# Patient Record
Sex: MALE | Race: OTHER | HISPANIC OR LATINO | URBAN - METROPOLITAN AREA
[De-identification: names, ages, dates, MRNs, and addresses within clinical notes are randomized per-mention and may not be internally consistent; named-entity substitution may affect disease eponyms.]

---

## 2021-02-01 RX ORDER — PANTOPRAZOLE SODIUM 20 MG/1
1 TABLET, DELAYED RELEASE ORAL
Qty: 0 | Refills: 0 | DISCHARGE

## 2021-02-01 RX ORDER — CHLORHEXIDINE GLUCONATE 213 G/1000ML
1 SOLUTION TOPICAL ONCE
Refills: 0 | Status: DISCONTINUED | OUTPATIENT
Start: 2021-04-13 | End: 2021-04-14

## 2021-02-01 NOTE — H&P ADULT - HISTORY OF PRESENT ILLNESS
74 y/o male with PMHx HTN, DM, HLD, PVD, Alcohol abuse disorder BPH, ED   Who initially presented to [place] with a few days of epigastric pain that was initially relived with Prilosec but later became worse, burning in nature 10/10  radiating to left hand and associated with nausea.   Subsequently patient underwent cardiac cath with YOSELIN to midRCA (85% ulcerative Culprit) and YOSELIN to RPDA (99% culprit) , residual midLAD 85%, distal LAD 50%m OM2 85%. Patient had elevated filling pressures during the procedure requiring IV nitro and Labetolol. LV gram showed EF 60%.     Patient has been compliant with DAPT   ****SKELETON****    Cardiologist: Dr. Jarvis NOLAN:  Pharmacy:  Escort:  76 y/o male with PMHx HTN, DM, HLD, PVD, Alcohol abuse disorder BPH, ED Who initially presented to [place] with a few days of epigastric pain that was initially relived with Prilosec but later became worse, burning in nature 10/10  radiating to left hand and associated with nausea. Subsequently patient underwent cardiac cath with YOSELIN to midRCA (85% ulcerative Culprit) and YOSELIN to RPDA (99% culprit) , residual midLAD 85%, distal LAD 50%m OM2 85%. Patient had elevated filling pressures during the procedure requiring IV nitro and Labetolol. LV gram showed EF 60%.   Since his procedure patient has been feeling_  Patient denies active chest pain, palpitations, shortness of breath, diaphoresis, fatigue, dizziness, syncope, lower extremity edema, orthopnea, positional nocturnal dyspnea, recent fever, chills, night sweats, cough, nausea, vomiting, and diarrhea.  Patient has been compliant with DAPT.  In light of patients risk factors, CCS class **, and known residual disease patient now presents for staged PCI of LAD and OM1

## 2021-04-01 VITALS
RESPIRATION RATE: 16 BRPM | DIASTOLIC BLOOD PRESSURE: 87 MMHG | HEART RATE: 76 BPM | OXYGEN SATURATION: 98 % | TEMPERATURE: 98 F | SYSTOLIC BLOOD PRESSURE: 220 MMHG

## 2021-04-01 NOTE — H&P ADULT - HISTORY OF PRESENT ILLNESS
****SKELETON****    Cardiologist: Dr. Jarvis NOLAN:  Pharmacy:  Escort:  74 y/o male with PMHx HTN, DM, HLD, PVD, Alcohol abuse disorder, BPH, CAD w/ NSTEMI s/p cardiac cath @ [facility] 01/02/2021  YOSELIN to midRCA (85% ulcerative Culprit) and YOSELIN to RPDA (99% culprit) , residual midLAD 85%, distal LAD 50%m OM2 85%. Patient had elevated filling pressures during the procedure requiring IV nitro and Labetolol. LV gram showed EF 60%  who now presents for staged PCI of LAD and OM1    Since his procedure patient has been feeling_...   Patient denies active chest pain, palpitations, shortness of breath, diaphoresis, fatigue, dizziness, syncope, lower extremity edema, orthopnea, positional nocturnal dyspnea, recent fever, chills, night sweats, cough, nausea, vomiting, and diarrhea.  Patient has been compliant with DAPT.  In light of patients risk factors, CCS class **, and known residual disease patient now presents for staged intervention,    ****SKELETON****    Cardiologist: Dr. Jarvis NOLAN:  Pharmacy:  Escort:  76 yo M with alcohol abuse disorder, PMHx HTN, DM, HLD, PVD, alcohol abuse disorder, BPH, CAD w/ NSTEMI s/p cardiac cath @ [facility] 01/02/2021  mRCA 85% s/p YOSELIN & RPDA 99% culprit s/p YOSELIN  (residual midLAD 85%, distal LAD 50%, OM2 85%)      . Patient had elevated filling pressures during the procedure requiring IV nitro and Labetolol. LV gram showed EF 60%  who now presents for staged PCI of LAD and OM1    Since his procedure patient has been feeling_...   Patient denies active chest pain, palpitations, shortness of breath, diaphoresis, fatigue, dizziness, syncope, lower extremity edema, orthopnea, positional nocturnal dyspnea, recent fever, chills, night sweats, cough, nausea, vomiting, and diarrhea.  Patient has been compliant with DAPT.  In light of patients risk factors, CCS class **, and known residual disease patient now presents for staged intervention,    ****SKELETON****    Cardiologist: Dr. Jarvis NOLAN:  Pharmacy:  Escort:    74 yo M with h/o alcohol abuse disorder, PMHx HTN, DM2, HLD, PVD, CAD w/ NSTEMI s/p cardiac cath @ [facility] 01/02/2021  mRCA 85% s/p YOSELIN & RPDA 99% culprit s/p YOSELIN  (residual midLAD 85%, distal LAD 50%, OM2 85%), gastritis (?colonoscopy/EGD), BPH,  who initially presented to [facility] with c/o epigastric pain described as burning and of 10/10 intensity radiating to L hand with associated nausea occurring independent of exertion that was initially relieved with Prilosec but later became wore.   Since procedure, pt endorses ___.   Patient has been compliant with DAPT.  Denies CP, SOB, dizziness, diaphoresis, palpitations, fatigue, LE edema, othopnea, PND, syncope, BRBPR, melena, epistaxis, hematemesis.   In light of patients risk factors, recent NSTEMI, known physiologically significant lesions, pt referred for staged PCI/atherectomy of residual mLAD 85% & OM2 85% lesions.       Cath hx:  Cardiac cath at [facility] 1/2/21: mRCA 85% s/p YOSELIN, RPDA 99% s/p YOSELIN with residual mLAD 85%, dLAD 50%, OM2 85%. During cath pt had elevated filling pressures  and given IV nitro and Labetolol,  LV gram showed EF 55%, referred for staged PCI of LAD/OM2 with atherectomy in 5-6 weeks.  ****SKELETON****    Cardiologist: Dr. Jarvis NOLAN:  Pharmacy:  Escort:    74 yo M with h/o alcohol abuse disorder, PMHx HTN, DM2, HLD, PVD, CAD w/ NSTEMI s/p cardiac cath @ [facility] 01/02/21  mRCA 85% s/p YOSELIN & RPDA 99% culprit s/p YOSELIN  (residual midLAD 85%, distal LAD 50%, OM2 85%), CVA (?when, residual deficits), Bell's palsy, gastritis (s/p colonoscopy in 2010/2013 s/p polypectomy, ? recent colonoscopy/EGD), BPH, erectile dysfunction  who initially presented to [facility] with c/o epigastric pain described as burning and of 10/10 intensity radiating to L hand with associated nausea occurring independent of exertion that was initially relieved with Prilosec but later became wore.   Since procedure, pt endorses ___.   Patient has been compliant with DAPT.  Denies CP, SOB, dizziness, diaphoresis, palpitations, fatigue, LE edema, othopnea, PND, syncope, BRBPR, melena, epistaxis, hematemesis.   In light of patients risk factors, recent NSTEMI, known physiologically significant lesions, pt referred for staged PCI/atherectomy of residual mLAD 85% & OM2 85% lesions.       Cath hx:  Cardiac cath at [facility] 1/2/21: mRCA 85% s/p YOSELIN, RPDA 99% s/p YOSELIN with residual mLAD 85%, dLAD 50%, OM2 85%. During cath pt had elevated filling pressures  and given IV nitro and Labetolol,  LV gram showed EF 55%, referred for staged PCI of LAD/OM2 with atherectomy in 5-6 weeks.  ****SKELETON****    Cardiologist: Dr. Jarvis NOLAN:  Pharmacy:  Escort:    76 yo M with h/o alcohol abuse disorder, PMHx HTN, DM2, HLD, PVD, CAD w/ NSTEMI s/p cardiac cath @ [facility] 01/02/21  mRCA 85% s/p YOSELIN & RPDA 99% culprit s/p YOSELIN  (residual midLAD 85%, distal LAD 50%, OM2 85%), CVA (?when, residual deficits), Bell's palsy, gastritis (s/p colonoscopy in 2010/2013 s/p polypectomy, ? recent colonoscopy/EGD), BPH, erectile dysfunction  who [place] [facility] [place]/o epigastric pain described as burning and of 10/10 intensity radiating to L hand with associated nausea occurring independent of exertion that was initially relieved with Prilosec but later became wore.   Since procedure, pt endorses ___.   Patient has been compliant with DAPT.  Denies CP, SOB, dizziness, diaphoresis, palpitations, fatigue, LE edema, othopnea, PND, syncope, BRBPR, melena, epistaxis, hematemesis.   In light of patients risk factors, recent NSTEMI, known physiologically significant lesions, pt referred for staged PCI/atherectomy of residual mLAD 85% & OM2 85% lesions.       Cath hx:  Cardiac [place] [facility] [place]21: mRCA 85% s/p YOSELIN, RPDA 99% s/p YOSELIN with residual mLAD 85%, dLAD 50%, OM2 85%. During cath pt had elevated filling pressures  and given IV nitro and Labetolol,  LV gram showed EF 55%, referred for staged PCI of LAD/OM2 with atherectomy in 5-6 weeks.  Covid 4/11/2021 negative in HIE  Cardiologist: Dr. Conley  Pharmacy: [facility]  Escort: N/A- staying overnight   75 year old non-compliant Hebrew/English Speaking M with history of ETOH abuse (4-5 beers/day) and PMHx HTN, HLD, PVD, DM Type II with Neuropathy, CAD w/ NSTEMI s/p cardiac cath @ [facility] 01/02/21  mRCA 85% s/p YOSELIN & RPDA 99% culprit s/p YOSELIN  (residual midLAD 85%, distal LAD 50%, OM2 85%), CVA (Thalamic Infarct per MD note-patient denies no residual deficits on exam), Bell's palsy, Gastritis (s/p colonoscopy in 2010/2013 s/p polypectomy-patient denies recent bleeding), BPH, erectile dysfunction  who [place] [facility] [place]/o epigastric pain described as burning and of 10/10 intensity radiating to L hand with associated nausea occurring independent of exertion that was initially relieved with Prilosec but later became wore. Patient denies other symptoms at that time.   Patient reports taking Aspirin daily but not sure about Plavix/Clopidogrel. He reports since PCI he still experiences intermittent occasional epigastric pain but denies SOB, palpitations, dizziness, diaphoresis, N/V, syncope, LE edema, PND, orthopnea, fever, chills, recent travel, cough, abdominal pain, diarrhea, URI symptoms, loss of taste or smell. In light of patients risk factors, recent NSTEMI, known physiologically significant lesions, and continued CCS Angina Class III-IV Equivalent symptoms, pt referred for staged PCI/atherectomy of residual mLAD 85% & OM2 85% lesions.     Patient did not bring medications and Pharmacy is closed until 10 AM. Medication list obtained from Dr. Conley's note. Meds to be confirmed when pharmacy opens.  Cath hx:  [place] [facility] [place]: mRCA 85% s/p YOSELIN, RPDA 99% s/p YOSELIN with residual mLAD 85%, dLAD 50%, OM2 85%. During cath pt had elevated filling pressures  and given IV nitro and Labetolol,  LV gram showed EF 55%, referred for staged PCI of LAD/OM2 with atherectomy in 5-6 weeks.   Covid 4/11/2021 negative in HIE  Cardiologist: Dr. Conley  Pharmacy: [facility]  Escort: N/A- staying overnight   75 year old non-compliant Tamazight/English Speaking M with history of ETOH abuse (4-5 beers/day-last drink 4/12/2021-patient denies history of withdrawal or LOC) and PMHx HTN, HLD, PVD, DM Type II with Neuropathy, CAD w/ NSTEMI s/p cardiac cath @ [facility] 01/02/21  mRCA 85% s/p YOSELIN & RPDA 99% culprit s/p YOSELIN  (residual midLAD 85%, distal LAD 50%, OM2 85%), CVA (Thalamic Infarct per MD note-patient denies no residual deficits on exam), Bell's palsy, Gastritis (s/p colonoscopy in 2010/2013 s/p polypectomy-patient denies recent bleeding), BPH, erectile dysfunction  who initially presented to [facility] with c/o epigastric pain described as burning and of 10/10 intensity radiating to L hand with associated nausea occurring independent of exertion that was initially relieved with Prilosec but later became wore. Patient denies other symptoms at that time.   Patient reports taking Aspirin daily but not sure about Plavix/Clopidogrel. He reports since PCI he still experiences intermittent occasional epigastric pain but denies SOB, palpitations, dizziness, diaphoresis, N/V, syncope, LE edema, PND, orthopnea, fever, chills, recent travel, cough, abdominal pain, diarrhea, URI symptoms, loss of taste or smell. In light of patients risk factors, recent NSTEMI, known physiologically significant lesions, and continued CCS Angina Class III-IV Equivalent symptoms, pt referred for staged PCI/atherectomy of residual mLAD 85% & OM2 85% lesions.     Patient did not bring medications and Pharmacy is closed until 10 AM. Medication list obtained from Dr. Conley's note. Meds to be confirmed when pharmacy opens.  Cath hx:  Cardiac cath at [facility] 1/2/21: mRCA 85% s/p YOSELIN, RPDA 99% s/p YOSELIN with residual mLAD 85%, dLAD 50%, OM2 85%. During cath pt had elevated filling pressures  and given IV nitro and Labetolol,  LV gram showed EF 55%, referred for staged PCI of LAD/OM2 with atherectomy in 5-6 weeks.   Covid 4/11/2021 negative in HIE  Cardiologist: Dr. Conley  [facility]acy: [facility]  Escort: N/A- staying overnight   Medications obtained from Pharmacy-  75 year old non-compliant Greenlandic/English Speaking M with history of ETOH abuse (4-5 beers/day-last drink 4/12/2021-patient denies history of withdrawal or LOC) and PMHx HTN, HLD, PVD, DM Type II with Neuropathy, CAD w/ NSTEMI s/p cardiac cath @ [facility] 01/02/21  mRCA 85% s/p YOSELIN & RPDA 99% culprit s/p YOSELIN  (residual midLAD 85%, distal LAD 50%, OM2 85%), CVA (Thalamic Infarct per MD note-patient denies no residual deficits on exam), Bell's palsy, Gastritis (s/p colonoscopy in 2010/2013 s/p polypectomy-patient denies recent bleeding), BPH, erectile dysfunction  who initially presented to [facility] with c/o epigastric pain described as burning and of 10/10 intensity radiating to L hand with associated nausea occurring independent of exertion that was initially relieved with Prilosec but later became wore. Patient denies other symptoms at that time.   Patient reports taking Aspirin daily but not sure about Plavix/Clopidogrel. He reports since PCI he still experiences intermittent occasional epigastric pain but denies SOB, palpitations, dizziness, diaphoresis, N/V, syncope, LE edema, PND, orthopnea, fever, chills, recent travel, cough, abdominal pain, diarrhea, URI symptoms, loss of taste or smell. In light of patients risk factors, recent NSTEMI, known physiologically significant lesions, and continued CCS Angina Class III-IV Equivalent symptoms, pt referred for staged PCI/atherectomy of residual mLAD 85% & OM2 85% lesions.   Medications obtained from pharmacy which was closed at time of patient arrival to cath lab and patient did not bring list or medications with him. Per Pharmacy patient has not filled statin or DM meds since 2019. He is able to state he took Aspirin and Carvedilol 4/12/2021 AM but last dose of other medications unknown.   Cath hx:  Cardiac cath at [facility] 1/2/21: mRCA 85% s/p YOSELIN, RPDA 99% s/p YOSELIN with residual mLAD 85%, dLAD 50%, OM2 85%. During cath pt had elevated filling pressures  and given IV nitro and Labetolol,  LV gram showed EF 55%, referred for staged PCI of LAD/OM2 with atherectomy in 5-6 weeks.

## 2021-04-01 NOTE — H&P ADULT - NSHPLABSRESULTS_GEN_ALL_CORE
14.7   6.32  )-----------( 142      ( 13 Apr 2021 07:26 )             43.9       04-13    139  |  103  |  26<H>  ----------------------------<  138<H>  4.7   |  25  |  1.00    Ca    9.4      13 Apr 2021 07:26    TPro  8.0  /  Alb  4.5  /  TBili  0.5  /  DBili  x   /  AST  65<H>  /  ALT  73<H>  /  AlkPhos  99  04-13      PT/INR - ( 13 Apr 2021 07:26 )   PT: 13.1 sec;   INR: 1.10          PTT - ( 13 Apr 2021 07:26 )  PTT:32.4 sec    CARDIAC MARKERS ( 13 Apr 2021 07:26 )  x     / x     / 80 U/L / x     / 3.6 ng/mL            EKG: 14.7   6.32  )-----------( 142      ( 13 Apr 2021 07:26 )             43.9       04-13    139  |  103  |  26<H>  ----------------------------<  138<H>  4.7   |  25  |  1.00    Ca    9.4      13 Apr 2021 07:26    TPro  8.0  /  Alb  4.5  /  TBili  0.5  /  DBili  x   /  AST  65<H>  /  ALT  73<H>  /  AlkPhos  99  04-13      PT/INR - ( 13 Apr 2021 07:26 )   PT: 13.1 sec;   INR: 1.10          PTT - ( 13 Apr 2021 07:26 )  PTT:32.4 sec    CARDIAC MARKERS ( 13 Apr 2021 07:26 )  x     / x     / 80 U/L / x     / 3.6 ng/mL            EKG: NSR at 68 bpm. 1 mm J point elevation V2. Flat T wave III, AVL. Low Voltage QRS III, AVL. Q wave III.

## 2021-04-01 NOTE — H&P ADULT - ASSESSMENT
75 year old non-compliant Omani/English Speaking M with history of ETOH abuse (4-5 beers/day) and PMHx HTN, HLD, PVD, DM Type II with Neuropathy, CAD w/ NSTEMI s/p cardiac cath @ [facility] 01/02/21  mRCA 85% s/p YOSELIN & RPDA 99% culprit s/p YOSELIN who presents for staged PCI of known residual LAD and OM2 disease.    ASA   III                       Mallampati III    Patient is a candidate for sedation: Yes  Sedation: Moderate    Risks & benefits of procedure and alternative therapy have been explained to the patient including but not limited to: allergic reaction, bleeding w/possible need for blood transfusion, infection, renal and vascular compromise, limb damage, arrhythmia, stroke, vessel dissection/perforation, Myocardial infarction, emergent CABG. Informed consent obtained and in chart.       Hgb/HCT normal at 14.3/47.9. Patient denies bleeding, BRBPR, melena, hematuria, hematuria. ASA  mg PO x 1 given prior to procedure. Patient did not bring list of medications and when asked about Plavix or Clopidogrel he is not sure if he is taking and reports to RN he is not taking. Plavix 600 mg PO x 1 given prior to procedure as patient is non-compliant.     75 year old non-compliant Ugandan/English Speaking M with history of ETOH abuse (4-5 beers/day) and PMHx HTN, HLD, PVD, DM Type II with Neuropathy, CAD w/ NSTEMI s/p cardiac cath @ [facility] 01/02/21  mRCA 85% s/p YOSELIN & RPDA 99% culprit s/p YOSELIN who presents for staged PCI of known residual LAD and OM2 disease.    ASA   III                       Mallampati III    Patient is a candidate for sedation: Yes  Sedation: Moderate    Risks & benefits of procedure and alternative therapy have been explained to the patient including but not limited to: allergic reaction, bleeding w/possible need for blood transfusion, infection, renal and vascular compromise, limb damage, arrhythmia, stroke, vessel dissection/perforation, Myocardial infarction, emergent CABG. Informed consent obtained and in chart.       Hgb/HCT normal at 14.3/47.9. Patient denies bleeding, BRBPR, melena, hematuria, hematuria. ASA  mg PO x 1 given prior to procedure. Patient did not bring list of medications and when asked about Plavix or Clopidogrel he is not sure if he is taking and reports to RN he is not taking. Plavix 600 mg PO x 1 given prior to procedure as patient is non-compliant.    Mild Transaminitis-AST 65 ALT 73 likely secondary to ETOH abuse.      75 year old non-compliant Turkish/English Speaking M with history of ETOH abuse (4-5 beers/day) and PMHx HTN, HLD, PVD, DM Type II with Neuropathy, CAD w/ NSTEMI s/p cardiac cath @ [facility] 01/02/21  mRCA 85% s/p YOSELIN & RPDA 99% culprit s/p YOSELIN who presents for staged PCI of known residual LAD and OM2 disease.    ASA   III                       Mallampati III    Patient is a candidate for sedation: Yes  Sedation: Moderate    Risks & benefits of procedure and alternative therapy have been explained to the patient including but not limited to: allergic reaction, bleeding w/possible need for blood transfusion, infection, renal and vascular compromise, limb damage, arrhythmia, stroke, vessel dissection/perforation, Myocardial infarction, emergent CABG. Informed consent obtained and in chart.       Hgb/HCT normal at 14.3/47.9. Patient denies bleeding, BRBPR, melena, hematuria, hematuria. ASA  mg PO x 1 given prior to procedure. Patient did not bring list of medications and when asked about Plavix or Clopidogrel he is not sure if he is taking and reports to RN he is not taking. Plavix 600 mg PO x 1 given prior to procedure as patient is non-compliant.    Mild Transaminitis-AST 65 ALT 73 likely secondary to ETOH abuse.     Patient arrived to the cath lab-Hypertensive Urgency-/87 Patient asymptomatic and denies H/A, changes in vision, paresthesias, weakness/paralysis of upper or lower extremities. Carvedilol 12.5 mg PO x 1 given (on list of medications per Dr. Conley's note).      75 year old non-compliant Kittitian/English Speaking M with history of ETOH abuse (4-5 beers/day-last drink 4/12/2021-patient denies history of withdrawal or LOC)) and PMHx HTN, HLD, PVD, DM Type II with Neuropathy, CAD w/ NSTEMI s/p cardiac cath @ [facility] 01/02/21  mRCA 85% s/p YOSELIN & RPDA 99% culprit s/p YOSELIN who presents for staged PCI of known residual LAD and OM2 disease.    ASA   III                       Mallampati III    Patient is a candidate for sedation: Yes  Sedation: Moderate    Risks & benefits of procedure and alternative therapy have been explained to the patient including but not limited to: allergic reaction, bleeding w/possible need for blood transfusion, infection, renal and vascular compromise, limb damage, arrhythmia, stroke, vessel dissection/perforation, Myocardial infarction, emergent CABG. Informed consent obtained and in chart.       Hgb/HCT normal at 14.3/47.9. Patient denies bleeding, BRBPR, melena, hematuria, hematuria. ASA  mg PO x 1 given prior to procedure. Patient did not bring list of medications and when asked about Plavix or Clopidogrel he is not sure if he is taking and reports to RN he is not taking. Plavix 600 mg PO x 1 given prior to procedure as patient is non-compliant.    Mild Transaminitis-AST 65 ALT 73 likely secondary to ETOH abuse.     Patient arrived to the cath lab-Hypertensive Urgency-/87 Patient asymptomatic and denies H/A, changes in vision, paresthesias, weakness/paralysis of upper or lower extremities. Carvedilol 12.5 mg PO x 1 given (on list of medications per Dr. Conley's note).      75 year old non-compliant Bhutanese/English Speaking M with history of ETOH abuse (4-5 beers/day-last drink 4/12/2021-patient denies history of withdrawal or LOC)) and PMHx HTN, HLD, PVD, DM Type II with Neuropathy, CAD w/ NSTEMI s/p cardiac cath @ [facility] 01/02/21  mRCA 85% s/p YOSELIN & RPDA 99% culprit s/p YOSELIN who presents for staged PCI of known residual LAD and OM2 disease.    ASA   III                       Mallampati III    Patient is a candidate for sedation: Yes  Sedation: Moderate    Risks & benefits of procedure and alternative therapy have been explained to the patient including but not limited to: allergic reaction, bleeding w/possible need for blood transfusion, infection, renal and vascular compromise, limb damage, arrhythmia, stroke, vessel dissection/perforation, Myocardial infarction, emergent CABG. Informed consent obtained and in chart.       Hgb/HCT normal at 14.3/47.9. Patient denies bleeding, BRBPR, melena, hematuria, hematuria. ASA  mg PO x 1 given prior to procedure. Patient did not bring list of medications and when asked about Plavix or Clopidogrel he is not sure if he is taking and reports to RN he is not taking. Plavix 600 mg PO x 1 given prior to procedure as patient is non-compliant. Pharmacy reports patient filled Plavix in 4/2021 however ? if patient actually taking.       Mild Transaminitis-AST 65 ALT 73 likely secondary to ETOH abuse.     Patient arrived to the cath lab-Hypertensive Urgency-/87 Patient asymptomatic and denies H/A, changes in vision, paresthesias, weakness/paralysis of upper or lower extremities. Carvedilol 12.5 mg PO x 1 given (on list of medications per Dr. Conley's note).

## 2021-04-13 ENCOUNTER — INPATIENT (INPATIENT)
Facility: HOSPITAL | Age: 76
LOS: 0 days | Discharge: HOME | DRG: 247 | End: 2021-04-14
Attending: HOSPITALIST | Admitting: HOSPITALIST
Payer: MEDICARE

## 2021-04-13 LAB
A1C WITH ESTIMATED AVERAGE GLUCOSE RESULT: 6.5 % — HIGH (ref 4–5.6)
ALBUMIN SERPL ELPH-MCNC: 4.5 G/DL — SIGNIFICANT CHANGE UP (ref 3.3–5)
ALP SERPL-CCNC: 99 U/L — SIGNIFICANT CHANGE UP (ref 40–120)
ALT FLD-CCNC: 73 U/L — HIGH (ref 10–45)
ANION GAP SERPL CALC-SCNC: 11 MMOL/L — SIGNIFICANT CHANGE UP (ref 5–17)
APTT BLD: 32.4 SEC — SIGNIFICANT CHANGE UP (ref 27.5–35.5)
AST SERPL-CCNC: 65 U/L — HIGH (ref 10–40)
BASOPHILS # BLD AUTO: 0.04 K/UL — SIGNIFICANT CHANGE UP (ref 0–0.2)
BASOPHILS NFR BLD AUTO: 0.6 % — SIGNIFICANT CHANGE UP (ref 0–2)
BILIRUB SERPL-MCNC: 0.5 MG/DL — SIGNIFICANT CHANGE UP (ref 0.2–1.2)
BUN SERPL-MCNC: 26 MG/DL — HIGH (ref 7–23)
CALCIUM SERPL-MCNC: 9.4 MG/DL — SIGNIFICANT CHANGE UP (ref 8.4–10.5)
CHLORIDE SERPL-SCNC: 103 MMOL/L — SIGNIFICANT CHANGE UP (ref 96–108)
CHOLEST SERPL-MCNC: 174 MG/DL — SIGNIFICANT CHANGE UP
CK MB CFR SERPL CALC: 3.6 NG/ML — SIGNIFICANT CHANGE UP (ref 0–6.7)
CK SERPL-CCNC: 80 U/L — SIGNIFICANT CHANGE UP (ref 30–200)
CO2 SERPL-SCNC: 25 MMOL/L — SIGNIFICANT CHANGE UP (ref 22–31)
CREAT SERPL-MCNC: 1 MG/DL — SIGNIFICANT CHANGE UP (ref 0.5–1.3)
EOSINOPHIL # BLD AUTO: 0.11 K/UL — SIGNIFICANT CHANGE UP (ref 0–0.5)
EOSINOPHIL NFR BLD AUTO: 1.7 % — SIGNIFICANT CHANGE UP (ref 0–6)
ESTIMATED AVERAGE GLUCOSE: 140 MG/DL — HIGH (ref 68–114)
GLUCOSE BLDC GLUCOMTR-MCNC: 127 MG/DL — HIGH (ref 70–99)
GLUCOSE BLDC GLUCOMTR-MCNC: 146 MG/DL — HIGH (ref 70–99)
GLUCOSE SERPL-MCNC: 138 MG/DL — HIGH (ref 70–99)
HCT VFR BLD CALC: 43.9 % — SIGNIFICANT CHANGE UP (ref 39–50)
HDLC SERPL-MCNC: 44 MG/DL — SIGNIFICANT CHANGE UP
HGB BLD-MCNC: 14.7 G/DL — SIGNIFICANT CHANGE UP (ref 13–17)
IMM GRANULOCYTES NFR BLD AUTO: 0.2 % — SIGNIFICANT CHANGE UP (ref 0–1.5)
INR BLD: 1.1 — SIGNIFICANT CHANGE UP (ref 0.88–1.16)
LIPID PNL WITH DIRECT LDL SERPL: 98 MG/DL — SIGNIFICANT CHANGE UP
LYMPHOCYTES # BLD AUTO: 2.08 K/UL — SIGNIFICANT CHANGE UP (ref 1–3.3)
LYMPHOCYTES # BLD AUTO: 32.9 % — SIGNIFICANT CHANGE UP (ref 13–44)
MCHC RBC-ENTMCNC: 31 PG — SIGNIFICANT CHANGE UP (ref 27–34)
MCHC RBC-ENTMCNC: 33.5 GM/DL — SIGNIFICANT CHANGE UP (ref 32–36)
MCV RBC AUTO: 92.6 FL — SIGNIFICANT CHANGE UP (ref 80–100)
MONOCYTES # BLD AUTO: 0.5 K/UL — SIGNIFICANT CHANGE UP (ref 0–0.9)
MONOCYTES NFR BLD AUTO: 7.9 % — SIGNIFICANT CHANGE UP (ref 2–14)
NEUTROPHILS # BLD AUTO: 3.58 K/UL — SIGNIFICANT CHANGE UP (ref 1.8–7.4)
NEUTROPHILS NFR BLD AUTO: 56.7 % — SIGNIFICANT CHANGE UP (ref 43–77)
NON HDL CHOLESTEROL: 130 MG/DL — HIGH
NRBC # BLD: 0 /100 WBCS — SIGNIFICANT CHANGE UP (ref 0–0)
PLATELET # BLD AUTO: 142 K/UL — LOW (ref 150–400)
POTASSIUM SERPL-MCNC: 4.7 MMOL/L — SIGNIFICANT CHANGE UP (ref 3.5–5.3)
POTASSIUM SERPL-SCNC: 4.7 MMOL/L — SIGNIFICANT CHANGE UP (ref 3.5–5.3)
PROT SERPL-MCNC: 8 G/DL — SIGNIFICANT CHANGE UP (ref 6–8.3)
PROTHROM AB SERPL-ACNC: 13.1 SEC — SIGNIFICANT CHANGE UP (ref 10.6–13.6)
RBC # BLD: 4.74 M/UL — SIGNIFICANT CHANGE UP (ref 4.2–5.8)
RBC # FLD: 12.9 % — SIGNIFICANT CHANGE UP (ref 10.3–14.5)
SODIUM SERPL-SCNC: 139 MMOL/L — SIGNIFICANT CHANGE UP (ref 135–145)
TRIGL SERPL-MCNC: 161 MG/DL — HIGH
WBC # BLD: 6.32 K/UL — SIGNIFICANT CHANGE UP (ref 3.8–10.5)
WBC # FLD AUTO: 6.32 K/UL — SIGNIFICANT CHANGE UP (ref 3.8–10.5)

## 2021-04-13 PROCEDURE — 93010 ELECTROCARDIOGRAM REPORT: CPT

## 2021-04-13 PROCEDURE — 92933 PRQ TRLML C ATHRC ST ANGIOP1: CPT | Mod: LD

## 2021-04-13 PROCEDURE — 93454 CORONARY ARTERY ANGIO S&I: CPT | Mod: 26,59

## 2021-04-13 PROCEDURE — 99152 MOD SED SAME PHYS/QHP 5/>YRS: CPT

## 2021-04-13 PROCEDURE — 99223 1ST HOSP IP/OBS HIGH 75: CPT

## 2021-04-13 PROCEDURE — 92978 ENDOLUMINL IVUS OCT C 1ST: CPT | Mod: 26,LD

## 2021-04-13 RX ORDER — CLOPIDOGREL BISULFATE 75 MG/1
600 TABLET, FILM COATED ORAL ONCE
Refills: 0 | Status: COMPLETED | OUTPATIENT
Start: 2021-04-13 | End: 2021-04-13

## 2021-04-13 RX ORDER — SODIUM CHLORIDE 9 MG/ML
500 INJECTION INTRAMUSCULAR; INTRAVENOUS; SUBCUTANEOUS
Refills: 0 | Status: DISCONTINUED | OUTPATIENT
Start: 2021-04-13 | End: 2021-04-14

## 2021-04-13 RX ORDER — ASPIRIN/CALCIUM CARB/MAGNESIUM 324 MG
325 TABLET ORAL ONCE
Refills: 0 | Status: DISCONTINUED | OUTPATIENT
Start: 2021-04-13 | End: 2021-04-13

## 2021-04-13 RX ORDER — CARVEDILOL PHOSPHATE 80 MG/1
12.5 CAPSULE, EXTENDED RELEASE ORAL ONCE
Refills: 0 | Status: COMPLETED | OUTPATIENT
Start: 2021-04-13 | End: 2021-04-13

## 2021-04-13 RX ORDER — ASPIRIN/CALCIUM CARB/MAGNESIUM 324 MG
81 TABLET ORAL DAILY
Refills: 0 | Status: DISCONTINUED | OUTPATIENT
Start: 2021-04-14 | End: 2021-04-14

## 2021-04-13 RX ORDER — CARVEDILOL PHOSPHATE 80 MG/1
12.5 CAPSULE, EXTENDED RELEASE ORAL EVERY 12 HOURS
Refills: 0 | Status: DISCONTINUED | OUTPATIENT
Start: 2021-04-13 | End: 2021-04-14

## 2021-04-13 RX ORDER — LOSARTAN POTASSIUM 100 MG/1
50 TABLET, FILM COATED ORAL DAILY
Refills: 0 | Status: DISCONTINUED | OUTPATIENT
Start: 2021-04-14 | End: 2021-04-14

## 2021-04-13 RX ORDER — ISOSORBIDE MONONITRATE 60 MG/1
30 TABLET, EXTENDED RELEASE ORAL DAILY
Refills: 0 | Status: DISCONTINUED | OUTPATIENT
Start: 2021-04-13 | End: 2021-04-14

## 2021-04-13 RX ORDER — ATORVASTATIN CALCIUM 80 MG/1
80 TABLET, FILM COATED ORAL AT BEDTIME
Refills: 0 | Status: DISCONTINUED | OUTPATIENT
Start: 2021-04-13 | End: 2021-04-14

## 2021-04-13 RX ORDER — ASPIRIN/CALCIUM CARB/MAGNESIUM 324 MG
325 TABLET ORAL ONCE
Refills: 0 | Status: COMPLETED | OUTPATIENT
Start: 2021-04-13 | End: 2021-04-13

## 2021-04-13 RX ORDER — PANTOPRAZOLE SODIUM 20 MG/1
40 TABLET, DELAYED RELEASE ORAL
Refills: 0 | Status: DISCONTINUED | OUTPATIENT
Start: 2021-04-13 | End: 2021-04-14

## 2021-04-13 RX ORDER — CLOPIDOGREL BISULFATE 75 MG/1
75 TABLET, FILM COATED ORAL DAILY
Refills: 0 | Status: DISCONTINUED | OUTPATIENT
Start: 2021-04-14 | End: 2021-04-14

## 2021-04-13 RX ADMIN — CLOPIDOGREL BISULFATE 600 MILLIGRAM(S): 75 TABLET, FILM COATED ORAL at 08:09

## 2021-04-13 RX ADMIN — SODIUM CHLORIDE 75 MILLILITER(S): 9 INJECTION INTRAMUSCULAR; INTRAVENOUS; SUBCUTANEOUS at 11:12

## 2021-04-13 RX ADMIN — Medication 325 MILLIGRAM(S): at 08:09

## 2021-04-13 RX ADMIN — CARVEDILOL PHOSPHATE 12.5 MILLIGRAM(S): 80 CAPSULE, EXTENDED RELEASE ORAL at 16:08

## 2021-04-13 RX ADMIN — ISOSORBIDE MONONITRATE 30 MILLIGRAM(S): 60 TABLET, EXTENDED RELEASE ORAL at 15:39

## 2021-04-13 RX ADMIN — ATORVASTATIN CALCIUM 80 MILLIGRAM(S): 80 TABLET, FILM COATED ORAL at 22:37

## 2021-04-13 RX ADMIN — CARVEDILOL PHOSPHATE 12.5 MILLIGRAM(S): 80 CAPSULE, EXTENDED RELEASE ORAL at 08:11

## 2021-04-14 VITALS
SYSTOLIC BLOOD PRESSURE: 146 MMHG | OXYGEN SATURATION: 98 % | RESPIRATION RATE: 17 BRPM | HEART RATE: 73 BPM | DIASTOLIC BLOOD PRESSURE: 59 MMHG

## 2021-04-14 LAB
ANION GAP SERPL CALC-SCNC: 8 MMOL/L — SIGNIFICANT CHANGE UP (ref 5–17)
BUN SERPL-MCNC: 20 MG/DL — SIGNIFICANT CHANGE UP (ref 7–23)
CALCIUM SERPL-MCNC: 9.2 MG/DL — SIGNIFICANT CHANGE UP (ref 8.4–10.5)
CHLORIDE SERPL-SCNC: 105 MMOL/L — SIGNIFICANT CHANGE UP (ref 96–108)
CO2 SERPL-SCNC: 25 MMOL/L — SIGNIFICANT CHANGE UP (ref 22–31)
COVID-19 SPIKE DOMAIN AB INTERP: POSITIVE
COVID-19 SPIKE DOMAIN ANTIBODY RESULT: >250 U/ML — HIGH
CREAT SERPL-MCNC: 1.04 MG/DL — SIGNIFICANT CHANGE UP (ref 0.5–1.3)
GLUCOSE BLDC GLUCOMTR-MCNC: 164 MG/DL — HIGH (ref 70–99)
GLUCOSE SERPL-MCNC: 153 MG/DL — HIGH (ref 70–99)
HCT VFR BLD CALC: 40.9 % — SIGNIFICANT CHANGE UP (ref 39–50)
HGB BLD-MCNC: 13.3 G/DL — SIGNIFICANT CHANGE UP (ref 13–17)
MAGNESIUM SERPL-MCNC: 2 MG/DL — SIGNIFICANT CHANGE UP (ref 1.6–2.6)
MCHC RBC-ENTMCNC: 30.9 PG — SIGNIFICANT CHANGE UP (ref 27–34)
MCHC RBC-ENTMCNC: 32.5 GM/DL — SIGNIFICANT CHANGE UP (ref 32–36)
MCV RBC AUTO: 94.9 FL — SIGNIFICANT CHANGE UP (ref 80–100)
NRBC # BLD: 0 /100 WBCS — SIGNIFICANT CHANGE UP (ref 0–0)
PLATELET # BLD AUTO: 113 K/UL — LOW (ref 150–400)
POTASSIUM SERPL-MCNC: 4.3 MMOL/L — SIGNIFICANT CHANGE UP (ref 3.5–5.3)
POTASSIUM SERPL-SCNC: 4.3 MMOL/L — SIGNIFICANT CHANGE UP (ref 3.5–5.3)
RBC # BLD: 4.31 M/UL — SIGNIFICANT CHANGE UP (ref 4.2–5.8)
RBC # FLD: 13 % — SIGNIFICANT CHANGE UP (ref 10.3–14.5)
SARS-COV-2 IGG+IGM SERPL QL IA: >250 U/ML — HIGH
SARS-COV-2 IGG+IGM SERPL QL IA: POSITIVE
SODIUM SERPL-SCNC: 138 MMOL/L — SIGNIFICANT CHANGE UP (ref 135–145)
WBC # BLD: 5 K/UL — SIGNIFICANT CHANGE UP (ref 3.8–10.5)
WBC # FLD AUTO: 5 K/UL — SIGNIFICANT CHANGE UP (ref 3.8–10.5)

## 2021-04-14 PROCEDURE — C1894: CPT

## 2021-04-14 PROCEDURE — 80053 COMPREHEN METABOLIC PANEL: CPT

## 2021-04-14 PROCEDURE — 85610 PROTHROMBIN TIME: CPT

## 2021-04-14 PROCEDURE — 83036 HEMOGLOBIN GLYCOSYLATED A1C: CPT

## 2021-04-14 PROCEDURE — C1887: CPT

## 2021-04-14 PROCEDURE — 85027 COMPLETE CBC AUTOMATED: CPT

## 2021-04-14 PROCEDURE — 82553 CREATINE MB FRACTION: CPT

## 2021-04-14 PROCEDURE — C1874: CPT

## 2021-04-14 PROCEDURE — 82550 ASSAY OF CK (CPK): CPT

## 2021-04-14 PROCEDURE — C1753: CPT

## 2021-04-14 PROCEDURE — 93005 ELECTROCARDIOGRAM TRACING: CPT

## 2021-04-14 PROCEDURE — C1724: CPT

## 2021-04-14 PROCEDURE — 85025 COMPLETE CBC W/AUTO DIFF WBC: CPT

## 2021-04-14 PROCEDURE — 99239 HOSP IP/OBS DSCHRG MGMT >30: CPT

## 2021-04-14 PROCEDURE — 83735 ASSAY OF MAGNESIUM: CPT

## 2021-04-14 PROCEDURE — C1769: CPT

## 2021-04-14 PROCEDURE — 80061 LIPID PANEL: CPT

## 2021-04-14 PROCEDURE — 86769 SARS-COV-2 COVID-19 ANTIBODY: CPT

## 2021-04-14 PROCEDURE — 85730 THROMBOPLASTIN TIME PARTIAL: CPT

## 2021-04-14 PROCEDURE — 80048 BASIC METABOLIC PNL TOTAL CA: CPT

## 2021-04-14 PROCEDURE — 36415 COLL VENOUS BLD VENIPUNCTURE: CPT

## 2021-04-14 PROCEDURE — C1725: CPT

## 2021-04-14 PROCEDURE — 82962 GLUCOSE BLOOD TEST: CPT

## 2021-04-14 RX ORDER — CLOPIDOGREL BISULFATE 75 MG/1
1 TABLET, FILM COATED ORAL
Qty: 0 | Refills: 0 | DISCHARGE

## 2021-04-14 RX ORDER — CARVEDILOL PHOSPHATE 80 MG/1
1 CAPSULE, EXTENDED RELEASE ORAL
Qty: 60 | Refills: 3
Start: 2021-04-14 | End: 2021-08-11

## 2021-04-14 RX ORDER — ATORVASTATIN CALCIUM 80 MG/1
1 TABLET, FILM COATED ORAL
Qty: 30 | Refills: 3
Start: 2021-04-14 | End: 2021-08-11

## 2021-04-14 RX ORDER — ASPIRIN/CALCIUM CARB/MAGNESIUM 324 MG
1 TABLET ORAL
Qty: 30 | Refills: 11
Start: 2021-04-14 | End: 2022-04-08

## 2021-04-14 RX ORDER — ISOSORBIDE MONONITRATE 60 MG/1
1 TABLET, EXTENDED RELEASE ORAL
Qty: 30 | Refills: 3
Start: 2021-04-14 | End: 2021-08-11

## 2021-04-14 RX ORDER — CLOPIDOGREL BISULFATE 75 MG/1
1 TABLET, FILM COATED ORAL
Qty: 30 | Refills: 11
Start: 2021-04-14 | End: 2022-04-08

## 2021-04-14 RX ORDER — ASPIRIN/CALCIUM CARB/MAGNESIUM 324 MG
1 TABLET ORAL
Qty: 0 | Refills: 0 | DISCHARGE

## 2021-04-14 RX ORDER — LOSARTAN POTASSIUM 100 MG/1
1 TABLET, FILM COATED ORAL
Qty: 30 | Refills: 3
Start: 2021-04-14 | End: 2021-08-11

## 2021-04-14 RX ORDER — ISOSORBIDE MONONITRATE 60 MG/1
1 TABLET, EXTENDED RELEASE ORAL
Qty: 0 | Refills: 0 | DISCHARGE

## 2021-04-14 RX ORDER — HYDRALAZINE HCL 50 MG
5 TABLET ORAL ONCE
Refills: 0 | Status: COMPLETED | OUTPATIENT
Start: 2021-04-14 | End: 2021-04-14

## 2021-04-14 RX ORDER — CARVEDILOL PHOSPHATE 80 MG/1
1 CAPSULE, EXTENDED RELEASE ORAL
Qty: 0 | Refills: 0 | DISCHARGE

## 2021-04-14 RX ORDER — ATORVASTATIN CALCIUM 80 MG/1
1 TABLET, FILM COATED ORAL
Qty: 0 | Refills: 0 | DISCHARGE

## 2021-04-14 RX ORDER — LOSARTAN POTASSIUM 100 MG/1
1 TABLET, FILM COATED ORAL
Qty: 0 | Refills: 0 | DISCHARGE

## 2021-04-14 RX ADMIN — ISOSORBIDE MONONITRATE 30 MILLIGRAM(S): 60 TABLET, EXTENDED RELEASE ORAL at 10:25

## 2021-04-14 RX ADMIN — LOSARTAN POTASSIUM 50 MILLIGRAM(S): 100 TABLET, FILM COATED ORAL at 05:20

## 2021-04-14 RX ADMIN — Medication 81 MILLIGRAM(S): at 10:26

## 2021-04-14 RX ADMIN — Medication 5 MILLIGRAM(S): at 12:27

## 2021-04-14 RX ADMIN — CLOPIDOGREL BISULFATE 75 MILLIGRAM(S): 75 TABLET, FILM COATED ORAL at 10:26

## 2021-04-14 RX ADMIN — PANTOPRAZOLE SODIUM 40 MILLIGRAM(S): 20 TABLET, DELAYED RELEASE ORAL at 05:20

## 2021-04-14 RX ADMIN — CARVEDILOL PHOSPHATE 12.5 MILLIGRAM(S): 80 CAPSULE, EXTENDED RELEASE ORAL at 05:20

## 2021-04-14 NOTE — PROVIDER CONTACT NOTE (CHANGE IN STATUS NOTIFICATION) - ASSESSMENT
Patient asymptomatic
Hematoma at right cath site.
Patient asymptomatic
Performed stroke scale no significant findings

## 2021-04-14 NOTE — DISCHARGE NOTE NURSING/CASE MANAGEMENT/SOCIAL WORK - PATIENT PORTAL LINK FT
You can access the FollowMyHealth Patient Portal offered by [facility] by registering at the following website: [email or web address]. By joining [facility]’s FollowMyHealth portal, you will also be able to view your health information using other applications (apps) compatible with our system.

## 2021-04-14 NOTE — PROVIDER CONTACT NOTE (CHANGE IN STATUS NOTIFICATION) - ACTION/TREATMENT ORDERED:
Jayy GUADALUPE came to beside and applied pressure and marked site/
As per PA wait 1 hour for discharge
Give imdur daily at 10am.
Hydralazine IV push

## 2021-04-14 NOTE — DISCHARGE NOTE PROVIDER - NSDCMRMEDTOKEN_GEN_ALL_CORE_FT
Aspirin Enteric Coated 81 mg oral delayed release tablet: 1 tab(s) orally once a day  atorvastatin 80 mg oral tablet: 1 tab(s) orally once a day  Coreg 12.5 mg oral tablet: 1 tab(s) orally 2 times a day  isosorbide mononitrate 30 mg oral tablet, extended release: 1 tab(s) orally once a day (in the morning)  losartan 50 mg oral tablet: 1 tab(s) orally once a day  pantoprazole 40 mg oral delayed release tablet: 1 tab(s) orally once a day  Plavix 75 mg oral tablet: 1 tab(s) orally once a day   Aspirin Enteric Coated 81 mg oral delayed release tablet: 1 tab(s) orally once a day  atorvastatin 80 mg oral tablet: 1 tab(s) orally once a day  Cardiac Rehab: Cardiac Rehab 3 days/week x 12 weeks for diagnosis of CAD  Coreg 12.5 mg oral tablet: 1 tab(s) orally 2 times a day  isosorbide mononitrate 30 mg oral tablet, extended release: 1 tab(s) orally once a day (in the morning)  losartan 50 mg oral tablet: 1 tab(s) orally once a day  pantoprazole 40 mg oral delayed release tablet: 1 tab(s) orally once a day  Plavix 75 mg oral tablet: 1 tab(s) orally once a day

## 2021-04-14 NOTE — PROVIDER CONTACT NOTE (CHANGE IN STATUS NOTIFICATION) - RECOMMENDATIONS
Wait for discharge for 1 hour, to see if symptoms resolve.
Give imdur early.
Provider to come assess at bedside.
Blood pressure medication

## 2021-04-14 NOTE — DISCHARGE NOTE PROVIDER - CARE PROVIDERS DIRECT ADDRESSES
[email or web address] ,[email or web address]

## 2021-04-14 NOTE — DISCHARGE NOTE PROVIDER - HOSPITAL COURSE
76yo non-compliant Italian/English Speaking Male with hx of ETOH abuse (4-5 beers/day) and PMHx HTN, HLD, PVD, DM Type II with Neuropathy, CAD w/ NSTEMI s/p cardiac cath @ [facility] 01/02/21 YOSELIN mRCA and YOSELIN RPDA with residual midLAD 85%, distal LAD 50%, OM2 85%), CVA (no residual deficits), and BPH, who initially presented to [facility] with c/o epigastric pain described as burning and of 10/10 intensity radiating to L hand with associated nausea occurring independent of exertion that was initially relieved with Prilosec but later became wore and subsequent underwent cardiac catheterization. In light of patients risk factors, continued CCS Angina Class III-IV Equivalent symptoms and residual CAD, pt referred for staged PCI/atherectomy of residual disease.  Pt now s/p cardiac catheterization 4/13/21 with CSI/YOSELIN pLAD with residual OM2 80% (plan for staged PCI in 4-6 weeks) as well as pD1 30%, LCx mild luminal, and patent stents in mRCA and RPDA, access R radial. Pt admitted overnight for observation and telemetry monitoring. Pt seen and examined at bedside this AM without any complaints or events overnight, VSS, labs and telemetry reviewed and pt stable for discharge as discussed with Dr. Anderson. Pt has received appropriate discharge instructions, including medication regimen, access site management and follow up with Dr. Conley in 1-2 weeks. Discharge medication regimen has been reviewed with attending with plan for patient to take ASA 81mg QD, Plavix 75mg QD, ____. 74yo non-compliant Macedonian/English Speaking Male with hx of ETOH abuse (4-5 beers/day) and PMHx HTN, HLD, PVD, DM Type II with Neuropathy, CAD w/ NSTEMI s/p cardiac cath @ [facility] 01/02/21 YOSELIN mRCA and YOSELNI RPDA with residual midLAD 85%, distal LAD 50%, OM2 85%), CVA (no residual deficits), and BPH, who initially presented [place] [facility] with c/o epigastric pain described as burning and of 10/10 intensity radiating to L hand with associated nausea occurring independent of exertion that was initially relieved with Prilosec but later became wore and subsequent underwent cardiac catheterization. In light of patients risk factors, continued CCS Angina Class III-IV Equivalent symptoms and residual CAD, pt referred for staged PCI/atherectomy of residual disease.  Pt now s/p cardiac catheterization 4/13/21 with CSI/YOSELIN pLAD with residual OM2 80% (plan for staged PCI in 4-6 weeks) as well as pD1 30%, LCx mild luminal, and patent stents in mRCA and RPDA, access R radial. Pt admitted overnight for observation and telemetry monitoring. Pt seen and examined at bedside this AM without any complaints or events overnight, VSS, labs and telemetry reviewed and pt stable for discharge as discussed with Dr. Anderson. Pt has received appropriate discharge instructions, including medication regimen, access site management and follow up with Dr. Conley in 1-2 weeks. Discharge medication regimen has been reviewed with attending with plan for patient to take ASA 81mg QD, Plavix 75mg QD, Carvedilol 12.5mg BID, Losartan 50mg QD, Atorvastatin 80mg QHS Imdur 30mg QD. 74yo non-compliant Kinyarwanda/English Speaking Male with hx of ETOH abuse (4-5 beers/day) and PMHx HTN, HLD, PVD, DM Type II with Neuropathy, CAD w/ NSTEMI s/p cardiac cath @ [facility] 01/02/21 YOSELIN mRCA and YOSELIN RPDA with residual midLAD 85%, distal LAD 50%, OM2 85%), CVA (no residual deficits), and BPH, who initially presented [place] [facility] with c/o epigastric pain described as burning and of 10/10 intensity radiating to L hand with associated nausea occurring independent of exertion that was initially relieved with Prilosec but later became wore and subsequent underwent cardiac catheterization. In light of patients risk factors, continued CCS Angina Class III-IV Equivalent symptoms and residual CAD, pt referred for staged PCI/atherectomy of residual disease.  Pt now s/p cardiac catheterization 4/13/21 with CSI/YOSELIN pLAD with residual OM2 80% (plan for staged PCI in 4-6 weeks) as well as pD1 30%, LCx mild luminal, and patent stents in mRCA and RPDA, access R radial. Pt admitted overnight for observation and telemetry monitoring. Pt seen and examined at bedside this AM without any complaints or events overnight, VSS, labs and telemetry reviewed and pt stable for discharge as discussed with Dr. Anderson. Pt has received appropriate discharge instructions, including medication regimen, access site management and follow up with Dr. Conley in 1-2 weeks. Discharge medication regimen has been reviewed with attending with plan for patient to take ASA 81mg QD, Plavix 75mg QD, Carvedilol 12.5mg BID, Losartan 50mg QD, Atorvastatin 80mg QHS Imdur 30mg QD.           Cardiac Rehab (Post PCI):            *Education on benefits of Cardiac Rehab provided to patient: Yes         *Referral and Prescription Given for Cardiac Rehab : Yes         *Pt given list of locations & instructed to contact their insurance company to review list of participating providers

## 2021-04-14 NOTE — DISCHARGE NOTE PROVIDER - NSDCCPCAREPLAN_GEN_ALL_CORE_FT
PRINCIPAL DISCHARGE DIAGNOSIS  Diagnosis: CAD (coronary artery disease)  Assessment and Plan of Treatment: You were found to have blockages in the arteries of your heart, also known as Coronary Artery Disease. You underwent a cardiac cardiac catheterization on 4/13/21 and received a stent to the left anterior artery. You still have 1 residual blockage in the [place] Marginal artery that you must return in 4-6 weeks to receive a stent.  PLEASE CONTINUE ASPIRIN 81MG DAILY AND PLAVIX 75MG DAILY. DO NOT STOP THESE MEDICATIONS FOR ANY REASON AS THEY ARE KEEPING YOUR STENT OPEN AND PREVENTING A HEART ATTACK.   Avoid strenuous activity or heavy lifting anything more than 5lbs for the next five days. Do not take a bath or swim for the next five days; you may shower. For any bleeding or hematoma formation (hardened blood collection under the skin) at the access site of your right wrist please hold pressure and go to the emergency room. Please follow up with Dr. Conley in 1-2 weeks. For recurrent chest pain, please call your doctor or go to the emergency room.      SECONDARY DISCHARGE DIAGNOSES  Diagnosis: HTN (hypertension)  Assessment and Plan of Treatment: Please continue ___ as listed to keep your blood pressure controlled. For blood pressure that is too high or too low please see your doctor or go to the emergency room as necessary.    Diagnosis: HLD (hyperlipidemia)  Assessment and Plan of Treatment: Please continue ____ at bedtime to keep your cholesterol low. High cholesterol contributes to heart disease.     PRINCIPAL DISCHARGE DIAGNOSIS  Diagnosis: CAD (coronary artery disease)  Assessment and Plan of Treatment: You were found to have blockages in the arteries of your heart, also known as Coronary Artery Disease. You underwent a cardiac cardiac catheterization on 4/13/21 and received a stent to the left anterior artery. You still have 1 residual blockage in the [place] Marg[place] artery that you must return in 4-6 weeks to receive a stent.  PLEASE CONTINUE ASPIRIN 81MG DAILY AND PLAVIX 75MG DAILY. DO NOT STOP THESE MEDICATIONS FOR ANY REASON AS THEY ARE KEEPING YOUR STENT OPEN AND PREVENTING A HEART ATTACK.   Avoid strenuous activity or heavy lifting anything more than 5lbs for the next five days. Do not take a bath or swim for the next five days; you may shower. For any bleeding or hematoma formation (hardened blood collection under the skin) at the access site of your right wrist please hold pressure and go to the emergency room. Please follow up with Dr. Conley in 1-2 weeks. For recurrent chest pain, please call your doctor or go to the emergency room.      SECONDARY DISCHARGE DIAGNOSES  Diagnosis: HTN (hypertension)  Assessment and Plan of Treatment: Please continue Carvedilol 12.5mg twice daily, Losartan 50mg once daily, and Imdur (Isosorbide Mononitrate) 30mg once daily as listed to keep your blood pressure controlled. For blood pressure that is too high or too low please see your doctor or go to the emergency room as necessary.    Diagnosis: HLD (hyperlipidemia)  Assessment and Plan of Treatment: Please continue Atorvastatin 80mg once daily at bedtime to keep your cholesterol low. High cholesterol contributes to heart disease.    Diagnosis: Diabetes  Assessment and Plan of Treatment: You have diabetes which is currently well controlled. Your hemoglobin A1c which measures your average blood sugar over 3 months is 6.5. Normal is 5.6 and less, pre-diabetes is 5.7-6.4, diabetes is 6.5 and above.  Please make sure to follow a low carbohydrate diet and follow up with your primary care doctor within 1-2 weeks.

## 2021-04-14 NOTE — DISCHARGE NOTE PROVIDER - CARE PROVIDER_API CALL
Brian Conley (DO)  Cardiovascular Disease; Interventional Cardiology  [address], [address] [place] [address], [place] [address]  Phone: [phone]  Fax: [phone]  Follow Up Time: 1 week   Brian Conley  First Floor  [address] [place], [place], [address]  Phone: [phone]  Fax: (   )    -  Established Patient  Follow Up Time: 1 week

## 2021-04-14 NOTE — PROVIDER CONTACT NOTE (CHANGE IN STATUS NOTIFICATION) - BACKGROUND
Patient s/p cardiac cath to right groin.
Patient s/p cardiac cath

## 2021-04-14 NOTE — DISCHARGE NOTE PROVIDER - NSDCFUADDINST_GEN_ALL_CORE_FT
Call the Interventional Cardiology Team at [phone] or [phone] if any of following occur pertaining to your vascular access site:  Bleeding or hematoma formation (collection of blood under the skin), drainage or redness at the puncture site, numbness, decrease in strength, coolness or pale coloration of skin of the hand.

## 2021-04-14 NOTE — DISCHARGE NOTE PROVIDER - PROVIDER TOKENS
PROVIDER:[TOKEN:[[id number]],FOLLOWUP:[1 week]] FREE:[LAST:[Coryla],FIRST:[Brian],PHONE:[phone]],FAX:[(   )    -],ADDRESS:[[facility]  [address], [address]],FOLLOWUP:[1 week],ESTABLISHEDPATIENT:[T]]

## 2021-04-19 DIAGNOSIS — I25.84 CORONARY ATHEROSCLEROSIS DUE TO CALCIFIED CORONARY LESION: ICD-10-CM

## 2021-04-19 DIAGNOSIS — I25.2 OLD MYOCARDIAL INFARCTION: ICD-10-CM

## 2021-04-19 DIAGNOSIS — F10.10 ALCOHOL ABUSE, UNCOMPLICATED: ICD-10-CM

## 2021-04-19 DIAGNOSIS — I10 ESSENTIAL (PRIMARY) HYPERTENSION: ICD-10-CM

## 2021-04-19 DIAGNOSIS — Z79.82 LONG TERM (CURRENT) USE OF ASPIRIN: ICD-10-CM

## 2021-04-19 DIAGNOSIS — G51.0 BELL'S PALSY: ICD-10-CM

## 2021-04-19 DIAGNOSIS — Z79.02 LONG TERM (CURRENT) USE OF ANTITHROMBOTICS/ANTIPLATELETS: ICD-10-CM

## 2021-04-19 DIAGNOSIS — E78.5 HYPERLIPIDEMIA, UNSPECIFIED: ICD-10-CM

## 2021-04-19 DIAGNOSIS — E11.40 TYPE 2 DIABETES MELLITUS WITH DIABETIC NEUROPATHY, UNSPECIFIED: ICD-10-CM

## 2021-04-19 DIAGNOSIS — E11.51 TYPE 2 DIABETES MELLITUS WITH DIABETIC PERIPHERAL ANGIOPATHY WITHOUT GANGRENE: ICD-10-CM

## 2021-04-19 DIAGNOSIS — N40.0 BENIGN PROSTATIC HYPERPLASIA WITHOUT LOWER URINARY TRACT SYMPTOMS: ICD-10-CM

## 2021-04-19 DIAGNOSIS — I25.10 ATHEROSCLEROTIC HEART DISEASE OF NATIVE CORONARY ARTERY WITHOUT ANGINA PECTORIS: ICD-10-CM

## 2021-04-19 DIAGNOSIS — I16.0 HYPERTENSIVE URGENCY: ICD-10-CM

## 2021-04-19 DIAGNOSIS — Z86.73 PERSONAL HISTORY OF TRANSIENT ISCHEMIC ATTACK (TIA), AND CEREBRAL INFARCTION WITHOUT RESIDUAL DEFICITS: ICD-10-CM

## 2021-04-19 DIAGNOSIS — Z95.5 PRESENCE OF CORONARY ANGIOPLASTY IMPLANT AND GRAFT: ICD-10-CM

## 2021-09-04 NOTE — H&P ADULT - NSICDXPASTMEDICALHX_GEN_ALL_CORE_FT
PAST MEDICAL HISTORY:  Coronary artery disease     Diabetes     Hyperlipidemia     Hypertension     
DEFORMITY/PAIN/WEAKNESS
PAST MEDICAL HISTORY:  Coronary artery disease     Diabetes     Hyperlipidemia     Hypertension

## 2022-03-08 NOTE — H&P ADULT - NS MD HP PULSE FEMORAL
ARRANGE HOME LAB DRAW ASAP    LOWER GABAPENTIN TO 1AM / 1PM X 1 MONTH THEN ONLY BEDTIME    KEEP UP THE GOOD WORK !!  AUTHORIZE PROLIA   2+ Bilaterally-+Right Bruit/right normal/left normal

## 2025-05-26 NOTE — PROVIDER CONTACT NOTE (CHANGE IN STATUS NOTIFICATION) - SITUATION
Brannon hypertensive systolic 193
Patient c/o dizziness and dry mouth
Patient with hematoma to right groin.
Patient hypertensive
 used